# Patient Record
Sex: FEMALE | URBAN - METROPOLITAN AREA
[De-identification: names, ages, dates, MRNs, and addresses within clinical notes are randomized per-mention and may not be internally consistent; named-entity substitution may affect disease eponyms.]

---

## 2017-04-28 ENCOUNTER — IMPORTED ENCOUNTER (OUTPATIENT)
Dept: URBAN - METROPOLITAN AREA CLINIC 59 | Facility: CLINIC | Age: 72
End: 2017-04-28

## 2017-04-28 PROBLEM — Z96.1 PRESENCE OF PSEUDOPHAKIA: Noted: 2017-04-28

## 2017-04-28 PROBLEM — E10.3299 TYPE 1 DM WITH MILD NONPROLIFERATIVE DIABETIC RETINOPATHY WITHOUT MACULAR EDEMA: Noted: 2017-04-28

## 2017-04-28 PROBLEM — H43.813 VITREOUS DEGENERATION, BILATERAL: Noted: 2017-04-28

## 2017-04-28 PROCEDURE — 92014 COMPRE OPH EXAM EST PT 1/>: CPT

## 2018-04-27 ENCOUNTER — IMPORTED ENCOUNTER (OUTPATIENT)
Dept: URBAN - METROPOLITAN AREA CLINIC 59 | Facility: CLINIC | Age: 73
End: 2018-04-27

## 2018-04-27 PROBLEM — H43.813 VITREOUS DEGENERATION, BILATERAL: Noted: 2018-04-27

## 2018-04-27 PROBLEM — H04.123 TEAR FILM INSUFFICIENCY OF BILATERAL LACRIMAL GLANDS: Noted: 2018-04-27

## 2018-04-27 PROBLEM — E11.9 TYPE II DM W/O COMPLICATIONS: Noted: 2018-04-27

## 2018-04-27 PROCEDURE — 92014 COMPRE OPH EXAM EST PT 1/>: CPT

## 2019-04-26 ENCOUNTER — IMPORTED ENCOUNTER (OUTPATIENT)
Dept: URBAN - METROPOLITAN AREA CLINIC 59 | Facility: CLINIC | Age: 74
End: 2019-04-26

## 2019-04-26 PROBLEM — H04.123 TEAR FILM INSUFFICIENCY OF BILATERAL LACRIMAL GLANDS: Noted: 2019-04-26

## 2019-04-26 PROBLEM — H35.61 RETINAL HEMORRHAGE, RIGHT EYE: Noted: 2019-04-26

## 2019-04-26 PROBLEM — H43.813 VITREOUS DEGENERATION, BILATERAL: Noted: 2019-04-26

## 2019-04-26 PROBLEM — E11.9 TYPE II DM W/O COMPLICATIONS: Noted: 2019-04-26

## 2019-04-26 PROCEDURE — 92014 COMPRE OPH EXAM EST PT 1/>: CPT

## 2019-04-26 PROCEDURE — 92250 FUNDUS PHOTOGRAPHY W/I&R: CPT

## 2019-07-02 ENCOUNTER — IMPORTED ENCOUNTER (OUTPATIENT)
Dept: URBAN - METROPOLITAN AREA CLINIC 59 | Facility: CLINIC | Age: 74
End: 2019-07-02

## 2019-07-02 PROBLEM — H43.813 VITREOUS DEGENERATION, BILATERAL: Noted: 2019-07-02

## 2019-07-02 PROBLEM — H04.123 TEAR FILM INSUFFICIENCY OF BILATERAL LACRIMAL GLANDS: Noted: 2019-07-02

## 2019-07-02 PROBLEM — H35.61 RETINAL HEMORRHAGE, RIGHT EYE: Noted: 2019-07-02

## 2019-07-02 PROBLEM — E11.9 TYPE II DM W/O COMPLICATIONS: Noted: 2019-07-02

## 2019-07-02 PROCEDURE — 92134 CPTRZ OPH DX IMG PST SGM RTA: CPT

## 2019-07-02 PROCEDURE — 92014 COMPRE OPH EXAM EST PT 1/>: CPT

## 2020-02-18 ENCOUNTER — IMPORTED ENCOUNTER (OUTPATIENT)
Dept: URBAN - METROPOLITAN AREA CLINIC 59 | Facility: CLINIC | Age: 75
End: 2020-02-18

## 2020-02-18 PROBLEM — H43.813 VITREOUS DEGENERATION, BILATERAL: Noted: 2020-02-18

## 2020-02-18 PROBLEM — H04.123 TEAR FILM INSUFFICIENCY OF BILATERAL LACRIMAL GLANDS: Noted: 2020-02-18

## 2020-02-18 PROBLEM — E11.3293 TYPE II DM W/ MILD NONPROLIFERATIVE DIABETIC RETINOPATHY W/O MACULAR EDEMA OF BILATERAL EYES: Noted: 2020-02-18

## 2020-02-18 PROCEDURE — 92014 COMPRE OPH EXAM EST PT 1/>: CPT

## 2020-12-11 ENCOUNTER — IMPORTED ENCOUNTER (OUTPATIENT)
Dept: URBAN - METROPOLITAN AREA CLINIC 59 | Facility: CLINIC | Age: 75
End: 2020-12-11

## 2020-12-11 PROBLEM — H43.813 VITREOUS DEGENERATION, BILATERAL: Noted: 2020-12-11

## 2020-12-11 PROBLEM — E11.9 TYPE II DM W/O COMPLICATIONS: Noted: 2020-12-11

## 2020-12-11 PROCEDURE — 92014 COMPRE OPH EXAM EST PT 1/>: CPT

## 2023-10-15 ASSESSMENT — VISUAL ACUITY
OS_SC: 20/30-2
OD_SC: 20/25+1
OS_SC: 20/25-3
OD_SC: 20/25-1
OS_SC: 20/40
OS_SC: 20/40+1
OD_SC: 20/25+2
OS_SC: 20/25+2
OD_SC: 20/30+2
OD_SC: 20/40+2
OS_SC: 20/30-2
OD_SC: 20/30-2

## 2023-10-15 ASSESSMENT — TONOMETRY
OD_IOP_MMHG: 14
OS_IOP_MMHG: 16
OD_IOP_MMHG: 19
OS_IOP_MMHG: 15
OS_IOP_MMHG: 19
OD_IOP_MMHG: 18
OD_IOP_MMHG: 17
OD_IOP_MMHG: 16
OS_IOP_MMHG: 14
OS_IOP_MMHG: 18
OD_IOP_MMHG: 14
OS_IOP_MMHG: 19

## 2024-02-08 ENCOUNTER — CARE COORDINATION (OUTPATIENT)
Dept: OTHER | Facility: CLINIC | Age: 79
End: 2024-02-08

## 2024-02-08 RX ORDER — DULAGLUTIDE 0.75 MG/.5ML
0.75 INJECTION, SOLUTION SUBCUTANEOUS WEEKLY
COMMUNITY

## 2024-02-08 RX ORDER — GLIPIZIDE 10 MG/1
10 TABLET ORAL
COMMUNITY

## 2024-02-08 NOTE — CARE COORDINATION
Ambulatory Care Coordination Note  2024    Patient Current Location:  [facility]  [place] [address]    ACM contacted the patient by telephone. Verified name and  with patient as identifiers. Provided introduction to self, and explanation of the ACM role.     ACM: Margareth Acevedo RN  .  Spoke with patient today, identified myself and my role pt was very receptive. Pt lives alone has 3 daughters one of which is a nurse. She lives in an apt behind her daughters house . Pt is diabetic,. She started back on her diabetic medications about 2 weeks ago she said. She was not being very good at taking care of herself she said but now she is back on track. She does not check her blood sugar but she has the supplies to do it with , I encouraged her to at least try to check in once a week fasting pt said she has made an appt with an endocrinologist Dr Boggs to help her manage her diabetes. Pt is aware her last A1C was high. Pt said at this time she thinks she will be fine with her daughters support and also seeing an endocrinologist to help her in staying on track with her diabetes. Pt has been diabetic since she was 36 years old. Said she knows what to do she just needs to get back on track. Pt uses a pill box for her medications  Pt was appreciative of the call today and thanked me for checking in, she will call me in the future if anything changes. ACM will sign off at this time as patient feels she can manage     Margareth DUMONT, RN- [facility]  Associate Care Manager  [phone]  [email or web address]          No future appointments.